# Patient Record
Sex: FEMALE | Race: WHITE | NOT HISPANIC OR LATINO | URBAN - METROPOLITAN AREA
[De-identification: names, ages, dates, MRNs, and addresses within clinical notes are randomized per-mention and may not be internally consistent; named-entity substitution may affect disease eponyms.]

---

## 2023-06-21 ENCOUNTER — EMERGENCY (EMERGENCY)
Facility: HOSPITAL | Age: 33
LOS: 1 days | Discharge: HOME | End: 2023-06-21
Attending: EMERGENCY MEDICINE | Admitting: EMERGENCY MEDICINE
Payer: SELF-PAY

## 2023-06-21 VITALS
RESPIRATION RATE: 16 BRPM | HEIGHT: 64.17 IN | OXYGEN SATURATION: 99 % | DIASTOLIC BLOOD PRESSURE: 75 MMHG | WEIGHT: 143.3 LBS | HEART RATE: 75 BPM | SYSTOLIC BLOOD PRESSURE: 113 MMHG | TEMPERATURE: 98 F

## 2023-06-21 VITALS
DIASTOLIC BLOOD PRESSURE: 76 MMHG | RESPIRATION RATE: 18 BRPM | TEMPERATURE: 98 F | HEART RATE: 76 BPM | OXYGEN SATURATION: 99 % | SYSTOLIC BLOOD PRESSURE: 115 MMHG

## 2023-06-21 LAB
ALBUMIN SERPL ELPH-MCNC: 3.7 G/DL — SIGNIFICANT CHANGE UP (ref 3.3–5)
ALP SERPL-CCNC: 76 U/L — SIGNIFICANT CHANGE UP (ref 30–120)
ALT FLD-CCNC: 20 U/L DA — SIGNIFICANT CHANGE UP (ref 10–60)
ANION GAP SERPL CALC-SCNC: 9 MMOL/L — SIGNIFICANT CHANGE UP (ref 5–17)
APPEARANCE UR: ABNORMAL
AST SERPL-CCNC: 19 U/L — SIGNIFICANT CHANGE UP (ref 10–40)
BASOPHILS # BLD AUTO: 0.03 K/UL — SIGNIFICANT CHANGE UP (ref 0–0.2)
BASOPHILS NFR BLD AUTO: 0.4 % — SIGNIFICANT CHANGE UP (ref 0–2)
BILIRUB SERPL-MCNC: 0.2 MG/DL — SIGNIFICANT CHANGE UP (ref 0.2–1.2)
BILIRUB UR-MCNC: NEGATIVE — SIGNIFICANT CHANGE UP
BUN SERPL-MCNC: 14 MG/DL — SIGNIFICANT CHANGE UP (ref 7–23)
CALCIUM SERPL-MCNC: 9 MG/DL — SIGNIFICANT CHANGE UP (ref 8.4–10.5)
CHLORIDE SERPL-SCNC: 102 MMOL/L — SIGNIFICANT CHANGE UP (ref 96–108)
CO2 SERPL-SCNC: 27 MMOL/L — SIGNIFICANT CHANGE UP (ref 22–31)
COLOR SPEC: YELLOW — SIGNIFICANT CHANGE UP
CREAT SERPL-MCNC: 0.66 MG/DL — SIGNIFICANT CHANGE UP (ref 0.5–1.3)
DIFF PNL FLD: NEGATIVE — SIGNIFICANT CHANGE UP
EGFR: 119 ML/MIN/1.73M2 — SIGNIFICANT CHANGE UP
EOSINOPHIL # BLD AUTO: 0.08 K/UL — SIGNIFICANT CHANGE UP (ref 0–0.5)
EOSINOPHIL NFR BLD AUTO: 1.1 % — SIGNIFICANT CHANGE UP (ref 0–6)
EPI CELLS # UR: PRESENT
GLUCOSE SERPL-MCNC: 93 MG/DL — SIGNIFICANT CHANGE UP (ref 70–99)
GLUCOSE UR QL: NEGATIVE MG/DL — SIGNIFICANT CHANGE UP
HCG SERPL-ACNC: 1 MIU/ML — SIGNIFICANT CHANGE UP
HCT VFR BLD CALC: 38.1 % — SIGNIFICANT CHANGE UP (ref 34.5–45)
HGB BLD-MCNC: 13 G/DL — SIGNIFICANT CHANGE UP (ref 11.5–15.5)
IMM GRANULOCYTES NFR BLD AUTO: 0.1 % — SIGNIFICANT CHANGE UP (ref 0–0.9)
KETONES UR-MCNC: NEGATIVE MG/DL — SIGNIFICANT CHANGE UP
LEUKOCYTE ESTERASE UR-ACNC: NEGATIVE — SIGNIFICANT CHANGE UP
LYMPHOCYTES # BLD AUTO: 2.61 K/UL — SIGNIFICANT CHANGE UP (ref 1–3.3)
LYMPHOCYTES # BLD AUTO: 36.7 % — SIGNIFICANT CHANGE UP (ref 13–44)
MCHC RBC-ENTMCNC: 30.6 PG — SIGNIFICANT CHANGE UP (ref 27–34)
MCHC RBC-ENTMCNC: 34.1 GM/DL — SIGNIFICANT CHANGE UP (ref 32–36)
MCV RBC AUTO: 89.6 FL — SIGNIFICANT CHANGE UP (ref 80–100)
MONOCYTES # BLD AUTO: 0.66 K/UL — SIGNIFICANT CHANGE UP (ref 0–0.9)
MONOCYTES NFR BLD AUTO: 9.3 % — SIGNIFICANT CHANGE UP (ref 2–14)
NEUTROPHILS # BLD AUTO: 3.73 K/UL — SIGNIFICANT CHANGE UP (ref 1.8–7.4)
NEUTROPHILS NFR BLD AUTO: 52.4 % — SIGNIFICANT CHANGE UP (ref 43–77)
NITRITE UR-MCNC: NEGATIVE — SIGNIFICANT CHANGE UP
NRBC # BLD: 0 /100 WBCS — SIGNIFICANT CHANGE UP (ref 0–0)
PH UR: 7.5 — SIGNIFICANT CHANGE UP (ref 5–8)
PLATELET # BLD AUTO: 236 K/UL — SIGNIFICANT CHANGE UP (ref 150–400)
POTASSIUM SERPL-MCNC: 3.9 MMOL/L — SIGNIFICANT CHANGE UP (ref 3.5–5.3)
POTASSIUM SERPL-SCNC: 3.9 MMOL/L — SIGNIFICANT CHANGE UP (ref 3.5–5.3)
PROT SERPL-MCNC: 7.1 G/DL — SIGNIFICANT CHANGE UP (ref 6–8.3)
PROT UR-MCNC: NEGATIVE MG/DL — SIGNIFICANT CHANGE UP
RBC # BLD: 4.25 M/UL — SIGNIFICANT CHANGE UP (ref 3.8–5.2)
RBC # FLD: 12.3 % — SIGNIFICANT CHANGE UP (ref 10.3–14.5)
RBC CASTS # UR COMP ASSIST: 0 /HPF — SIGNIFICANT CHANGE UP (ref 0–4)
SODIUM SERPL-SCNC: 138 MMOL/L — SIGNIFICANT CHANGE UP (ref 135–145)
SP GR SPEC: 1.01 — SIGNIFICANT CHANGE UP (ref 1–1.03)
UROBILINOGEN FLD QL: 0.2 MG/DL — SIGNIFICANT CHANGE UP (ref 0.2–1)
WBC # BLD: 7.12 K/UL — SIGNIFICANT CHANGE UP (ref 3.8–10.5)
WBC # FLD AUTO: 7.12 K/UL — SIGNIFICANT CHANGE UP (ref 3.8–10.5)
WBC UR QL: 0 /HPF — SIGNIFICANT CHANGE UP (ref 0–5)

## 2023-06-21 PROCEDURE — 99284 EMERGENCY DEPT VISIT MOD MDM: CPT | Mod: 25

## 2023-06-21 PROCEDURE — 76830 TRANSVAGINAL US NON-OB: CPT | Mod: 26

## 2023-06-21 PROCEDURE — 81001 URINALYSIS AUTO W/SCOPE: CPT

## 2023-06-21 PROCEDURE — 36415 COLL VENOUS BLD VENIPUNCTURE: CPT

## 2023-06-21 PROCEDURE — 99284 EMERGENCY DEPT VISIT MOD MDM: CPT

## 2023-06-21 PROCEDURE — 87086 URINE CULTURE/COLONY COUNT: CPT

## 2023-06-21 PROCEDURE — 85025 COMPLETE CBC W/AUTO DIFF WBC: CPT

## 2023-06-21 PROCEDURE — 84702 CHORIONIC GONADOTROPIN TEST: CPT

## 2023-06-21 PROCEDURE — 76830 TRANSVAGINAL US NON-OB: CPT

## 2023-06-21 PROCEDURE — 80053 COMPREHEN METABOLIC PANEL: CPT

## 2023-06-21 NOTE — ED PROVIDER NOTE - ATTENDING APP SHARED VISIT CONTRIBUTION OF CARE
Ryder Espinosa MD: I have personally performed a face to face diagnostic evaluation on this patient.  I have reviewed the PA note and agree with the history, exam, and plan of care, except as noted.  History and Exam by me shows same findings as documented

## 2023-06-21 NOTE — ED PROVIDER NOTE - PATIENT PORTAL LINK FT
You can access the FollowMyHealth Patient Portal offered by [facility] by registering at the following website: [email or web address]. By joining [facility]’s FollowMyHealth portal, you will also be able to view your health information using other applications (apps) compatible with our system.

## 2023-06-21 NOTE — ED PROVIDER NOTE - GENITOURINARY, MLM
no CVA tenderness. no CMT. no ovarian tenderness or masses. scant amount of vag discharge (non-foul smelling)

## 2023-06-21 NOTE — ED PROVIDER NOTE - CLINICAL SUMMARY MEDICAL DECISION MAKING FREE TEXT BOX
Patient with pelvic pain for 3 weeks. Treated for STI and subsequently tested negative. Abdomen benign. Will get labs, sono, and check urine

## 2023-06-21 NOTE — ED PROVIDER NOTE - NSFOLLOWUPINSTRUCTIONS_ED_ALL_ED_FT
Pelvic Pain, Female  Pelvic pain is pain in your lower abdomen, below your belly button and between your hips. The pain may start suddenly (be acute), keep coming back (be recurring), or last a long time (become chronic). Pelvic pain that lasts longer than 6 months is considered chronic.    Pelvic pain may affect your:  Reproductive organs.  Urinary system.  Digestive tract.  Musculoskeletal system.  There are many potential causes of pelvic pain. Sometimes, the pain can be a result of digestive or urinary conditions, strained muscles or ligaments, or reproductive conditions. Sometimes the cause of pelvic pain is not known.    Follow these instructions at home:  Person sitting at a desk and writing in a notebook.  Take over-the-counter and prescription medicines only as told by your health care provider.  Rest as told by your health care provider.  Do not have sex if it hurts.  Keep a journal of your pelvic pain. Write down:  When the pain started.  Where the pain is located.  What seems to make the pain better or worse, such as food or your monthly period (menstrual cycle).  Any symptoms you have along with the pain.  Keep all follow-up visits. This is important.  Contact a health care provider if:  Medicine does not help your pain, or your pain comes back.  You have new symptoms.  You have abnormal vaginal discharge or bleeding, including bleeding after menopause.  You have a fever or chills.  You are constipated.  You have blood in your urine or stool (feces).  You have foul-smelling urine.  You feel weak or light-headed.  Get help right away if:  You have sudden severe pain.  Your pain gets steadily worse.  You have severe pain along with fever, nausea, vomiting, or excessive sweating.  You lose consciousness.  These symptoms may represent a serious problem that is an emergency. Do not wait to see if the symptoms will go away. Get medical help right away. Call your local emergency services (911 in the [place] Do not drive yourself to the hospital.    Summary  Pelvic pain is pain in your lower abdomen, below your belly button and between your hips.  There are many potential causes of pelvic pain.  Keep a journal of your pelvic pain.  This information is not intended to replace advice given to you by your health care provider. Make sure you discuss any questions you have with your health care provider.

## 2023-06-21 NOTE — ED PROVIDER NOTE - DIFFERENTIAL DIAGNOSIS
Differentials include but not limited to vaginitis, ovarian cyst, PID, urinary tract infection, endometriosis Differential Diagnosis

## 2023-06-21 NOTE — ED PROVIDER NOTE - CARE PROVIDER_API CALL
Gelacio Jhaveri  Obstetrics and Gynecology  [address] [place] [address]  Phone: [phone]  Fax: [phone]  Follow Up Time:

## 2023-06-21 NOTE — ED ADULT NURSE NOTE - OBJECTIVE STATEMENT
Pt p/w pelvic pain for 3 wks, was treated for UTI, then subsequently tested negative. started with abd pain, hens come in to the ED for eval

## 2023-06-21 NOTE — ED PROVIDER NOTE - OBJECTIVE STATEMENT
Otherwise healthy 33-year-old female presents with continued pelvic discomfort for the past 3 weeks.  Patient had unprotected sex and had some vaginal discharge and pelvic discomfort and some irregular vaginal bleeding.  Was seen at clinic and diagnosed with chlamydia and bacterial vaginosis 3 weeks ago.  Patient was prescribed doxycycline and Flagyl.  States the vaginal discharge and bleeding improved but continued to have some lower abdominal pelvic pressure.  Reports has slight vaginal discharge but no odor and very mild in nature.  Patient went back to clinic a couple days ago and was retested for vaginitis and STDs.  Was called 2 days ago and states everything was negative.  Patient believes the pelvic pressure was worse this morning so was concerned.  Patient took a home pregnancy test and was negative.  Denies any fevers.  Denies any urinary complaints.  no current PCP

## 2023-06-21 NOTE — ED PROVIDER NOTE - PROGRESS NOTE DETAILS
Labs and urinalysis unremarkable.  No cervical motion tenderness, low suspicion for PID.  Patient refusing vaginitis swab or GC chlamydia swab as she has had these test twice and states she already paid $800 for this test twice.  Says she does not want this test from due to financial concerns and does not want a repeat test.  Patient states she just had a negative test 3 days ago.  US results pending, Dr. Espinosa will assume care.

## 2023-06-23 LAB
CULTURE RESULTS: SIGNIFICANT CHANGE UP
SPECIMEN SOURCE: SIGNIFICANT CHANGE UP

## 2023-08-10 NOTE — ED PROVIDER NOTE - NSDCPRINTRESULTS_ED_ALL_ED
normal sinus rhythm
Patient requests all Lab, Cardiology, and Radiology Results on their Discharge Instructions

## 2025-02-18 NOTE — ED PROVIDER NOTE - CROS ED CARDIOVAS ALL NEG
Pharmacy faxed in a request for prior authorization on:    Medication: [email or web address]  Dosage: 0.25 MG/0.5 ML  Quantity requested:  2 mL  Pharmacy for prescription has been selected.    Initiation of prior authorization needed.     negative...